# Patient Record
(demographics unavailable — no encounter records)

---

## 2022-07-21 NOTE — XRAY REPORT
PROCEDURE:  Hand 3 View LT

 

INDICATIONS:  hand inj

 

TECHNIQUE:  3 views of the hand(s) acquired.  

 

COMPARISON:  None

 

FINDINGS:  

 

Bones: Acute fracture through base of fourth distal phalanx is seen with dorsal and medial displaceme
nt at fracture site. Fracture line is seen extending to fourth DIP joint..  No suspicious bony lesion
s.  

 

Soft tissues:  No suspicious soft tissue calcifications.  

 

IMPRESSION:  

Acute displaced intra-articular fracture of fourth distal phalangeal base as above.

 

Reviewed by: Jorge Matthews MD on 7/21/2022 7:50 PM PDT

Approved by: Jorge Matthews MD on 7/21/2022 7:50 PM PDT

 

 

Station ID:  [id number]

## 2022-07-21 NOTE — ED PHYSICIAN DOCUMENTATION
PD HPI UPPER EXT INJURY





- Stated complaint


Stated Complaint: L FINGER LAC





- Chief complaint


Chief Complaint: Laceration





- History obtained from


History obtained from: Patient (This is a right-handed gentleman who is 

up-to-date on tetanus who was working on his own car at home and dropped 

transfer case on the left hand and has a laceration and significant pain to the 

left ring finger and minor pain to the middle finger.)





Review of Systems


Constitutional: reports: Reviewed and negative


Eyes: reports: Reviewed and negative


Nose: reports: Reviewed and negative


Throat: reports: Reviewed and negative





PD PAST MEDICAL HISTORY





- Present Medications


Home Medications: 


                                Ambulatory Orders











 Medication  Instructions  Recorded  Confirmed


 


HYDROcod/ACETAM 5/325 [Norco 5/325] 1 - 2 tab PO Q6H PRN #15 tablet 07/21/22 


 


cephALEXin [Keflex] 500 mg PO Q6H #20 cap 07/21/22 














- Allergies


Allergies/Adverse Reactions: 


                                    Allergies











Allergy/AdvReac Type Severity Reaction Status Date / Time


 


No Known Drug Allergies Allergy   Verified 07/21/22 19:18














PD ED PE NORMAL





- Vitals


Vital signs reviewed: Yes





- General


General: Alert and oriented X 3, No acute distress





- Extremities


Extremities: Other (2 cm oblique laceration on the palmar side of the left 

fourth finger spanning the DIP with potential deformity.  Normal neurovascular 

function of the tip.  Will assess tendon function after anesthetic.  Mild 

tenderness to the DIP of the left third finger.)





- Neuro


Neuro: Alert and oriented X 3, Normal speech





Results





- Vitals


Vitals: 


                               Vital Signs - 24 hr











  07/21/22





  19:14


 


Temperature 36.2 C L


 


Heart Rate 82


 


Respiratory 16





Rate 


 


Blood Pressure 140/85 H


 


O2 Saturation 100








                                     Oxygen











O2 Source                      Room air

















- Rads (name of study)


  ** L hand XR


Radiology: EMP read contemporaneously (Displaced intra-articular fracture of the

 proximal part of the distal phalanx of the fourth finger)





Procedures





- Laceration (location)


  ** L 4th finger


Length in cm: 2


Wound type: Linear


Neurovascular status: Sensory intact


Tendon involvement: Tendon Injury


Anesthesia: Lidocaine 1%


Wound preparation: Chlorhexadine, Hibiclens, Irrigated copiously NS


Skin layer closure: Nylon, Interrupted, Size #-0 - enter number (4-0), Sutures -

 enter # (6)


Other: Patient tolerated well, No complications, Neurovascular intact, Tetanus 

booster given





- Splint (location)


  ** L 4th finger


Splint applied by: Tech


Type of splint: Metal foam finger splint





PD MEDICAL DECISION MAKING





- ED course


ED course: 





This young man has a injury to the nondominant ring finger.  He is found to have

 an open fracture and after anesthetic he is unable to flex at the DIP which is 

worrisome for a tendon injury.  The hand was thoroughly cleansed noting that it 

was covered with grease and his fingernails were cleansed as well.  Scrubbed the

 whole hand with Hibiclens and then the wound was closed with sutures.  Placed 

in a splint and discussed need for follow-up with hand surgery which she 

verbalizes understanding.





Departure





- Departure


Disposition: 01 Home, Self Care


Clinical Impression: 


Open fracture of phalanx of digit of hand


Qualifiers:


 Encounter type: initial encounter Qualified Code(s): S62.609B - Fracture of 

unspecified phalanx of unspecified finger, initial encounter for open fracture





Finger laceration


Qualifiers:


 Encounter type: initial encounter Finger: ring finger Damage to nail status: 

without damage Foreign body presence: without foreign body Laterality: left 

Qualified Code(s): S61.215A - Laceration without foreign body of left ring 

finger without damage to nail, initial encounter





Flexor tendon laceration of finger with open wound


Qualifiers:


 Encounter type: initial encounter Qualified Code(s): S56.129A - Laceration of 

flexor muscle, fascia and tendon of unspecified finger at forearm level, initial

 encounter





Condition: Good


Record reviewed to determine appropriate education?: Yes


Instructions:  ED Fx Finger Open


Prescriptions: 


cephALEXin [Keflex] 500 mg PO Q6H #20 cap


HYDROcod/ACETAM 5/325 [Norco 5/325] 1 - 2 tab PO Q6H PRN #15 tablet


 PRN Reason: Pain


Comments: 


As discussed, you have a laceration of the left fourth finger with a comminuted 

fracture that involves the joint of the distal phalanx of that digit.  It also 

seems like you probably have a tendon injury related to this.  Keep the splint 

on and dry, do not remove it.  You need to follow-up with a hand surgeon, the 

closest is in Dr. Flaquito Worthy's phone number is [phone]. 

Call them tomorrow for an appointment.  Let them know that you have a finger 

fracture with a tendon injury.





I sent your prescription electronically to [facility] in [place].





I am prescribing a short course of narcotic pain medication for you. These are 

potentially dangerous and addictive medications that should be used carefully.


These medications may constipate you. Take an over-the-counter stool softener 

(docusate) twice daily with plenty of water while taking these medications. If 

you go 24 hours without a bowel movement, take over-the-counter miralax, per 

package instructions.


Do not drink or drive while taking these medications.


If you received narcotic or sedating medications while in the emergency 

department, do not drive for 24 hours.


Store this medication in a safe, secure place and out of reach of children.


It is a violation of federal law to give or sell this medication to another 

person or to use in a manner other than prescribed.


The ED will not refill narcotic prescriptions, including prescriptions lost or 

stolen.


To dispose of unwanted medications:


1. [facility] [address] [facility] in 

[place] has a medication drop box. They accept prescription medications (in 

pill form) Monday through Friday 9:00 a.m. to 5:00 p.m.


2. The [facility] [employer] accepts prescription medications (in

pill form only) for disposal year round. Call [phone] for more 

information.


3. Contact the [facility] for the next [facility] sponsored prescription 

drug collection event. [phone], [phone], or [phone] 

[phone];


Note that many narcotic pain relievers also contain Tylenol/acetaminophen.  

Please ensure that your total dose of acetaminophen from all sources does not 

exceed 3 g (3000 mg) per day.





Discharge Date/Time: 07/21/22 20:10